# Patient Record
Sex: FEMALE | Race: WHITE
[De-identification: names, ages, dates, MRNs, and addresses within clinical notes are randomized per-mention and may not be internally consistent; named-entity substitution may affect disease eponyms.]

---

## 2018-03-29 ENCOUNTER — HOSPITAL ENCOUNTER (EMERGENCY)
Dept: HOSPITAL 57 - BURERS | Age: 53
Discharge: HOME | End: 2018-03-29
Payer: COMMERCIAL

## 2018-03-29 DIAGNOSIS — K57.92: Primary | ICD-10-CM

## 2018-03-29 DIAGNOSIS — E05.90: ICD-10-CM

## 2018-03-29 LAB
ALBUMIN SERPL BCG-MCNC: 4.3 G/DL (ref 3.5–5)
ALP SERPL-CCNC: 75 U/L (ref 40–150)
ALT SERPL W P-5'-P-CCNC: 30 U/L (ref 8–55)
ANION GAP SERPL CALC-SCNC: 20 MMOL/L (ref 10–20)
AST SERPL-CCNC: 27 U/L (ref 5–34)
BACTERIA UR QL AUTO: (no result) HPF
BASOPHILS # BLD AUTO: 0 THOU/UL (ref 0–0.2)
BASOPHILS NFR BLD AUTO: 0.4 % (ref 0–1)
BILIRUB SERPL-MCNC: 0.5 MG/DL (ref 0.2–1.2)
BUN SERPL-MCNC: 12 MG/DL (ref 9.8–20.1)
CALCIUM SERPL-MCNC: 9.2 MG/DL (ref 7.8–10.44)
CHLORIDE SERPL-SCNC: 102 MMOL/L (ref 98–107)
CO2 SERPL-SCNC: 23 MMOL/L (ref 22–29)
CREAT CL PREDICTED SERPL C-G-VRATE: 0 ML/MIN (ref 70–130)
EOSINOPHIL # BLD AUTO: 0.1 THOU/UL (ref 0–0.7)
EOSINOPHIL NFR BLD AUTO: 1 % (ref 0–10)
GLOBULIN SER CALC-MCNC: 2.8 G/DL (ref 2.4–3.5)
GLUCOSE SERPL-MCNC: 87 MG/DL (ref 70–105)
HGB BLD-MCNC: 14.8 G/DL (ref 12–16)
LYMPHOCYTES # BLD AUTO: 2.3 THOU/UL (ref 1.2–3.4)
LYMPHOCYTES NFR BLD AUTO: 22.3 % (ref 21–51)
MCH RBC QN AUTO: 29.1 PG (ref 27–31)
MCV RBC AUTO: 82.8 FL (ref 81–99)
MONOCYTES # BLD AUTO: 0.9 THOU/UL (ref 0.11–0.59)
MONOCYTES NFR BLD AUTO: 8.1 % (ref 0–10)
NEUTROPHILS # BLD AUTO: 7.2 THOU/UL (ref 1.4–6.5)
NEUTROPHILS NFR BLD AUTO: 68.3 % (ref 42–75)
PLATELET # BLD AUTO: 235 THOU/UL (ref 130–400)
POTASSIUM SERPL-SCNC: 4 MMOL/L (ref 3.5–5.1)
RBC # BLD AUTO: 5.1 MILL/UL (ref 4.2–5.4)
RBC UR QL AUTO: (no result) HPF (ref 0–3)
SODIUM SERPL-SCNC: 141 MMOL/L (ref 136–145)
SP GR UR STRIP: 1 (ref 1–1.03)
WBC # BLD AUTO: 10.5 THOU/UL (ref 4.8–10.8)
WBC UR QL AUTO: (no result) HPF (ref 0–3)

## 2018-03-29 PROCEDURE — 80053 COMPREHEN METABOLIC PANEL: CPT

## 2018-03-29 PROCEDURE — 81003 URINALYSIS AUTO W/O SCOPE: CPT

## 2018-03-29 PROCEDURE — 74177 CT ABD & PELVIS W/CONTRAST: CPT

## 2018-03-29 PROCEDURE — 85025 COMPLETE CBC W/AUTO DIFF WBC: CPT

## 2018-03-29 PROCEDURE — 36415 COLL VENOUS BLD VENIPUNCTURE: CPT

## 2018-03-29 PROCEDURE — 81015 MICROSCOPIC EXAM OF URINE: CPT

## 2018-03-29 NOTE — CT
CT ABDOMEN AND PELVIS WITH CONTRAST:

3/29/18

 

Spiral CT of the abdomen and pelvis was performed for evaluation of left lower quadrant pain. Axial s
lices were acquired using IV contrast only. Oral contrast was deferred by request. Axial slices were 
acquired, then coronal reconstructions were done. 

 

The primary finding on this study is an area of inflammatory streaking around the left colon at the j
unction of the lower descending colon and proximal sigmoid colon. The findings most likely represent 
diverticulitis, though there were not a plethora of diverticula noted in this patients colon. There i
s no dilation of bowel. No free air was seen. There is a trace of free fluid in the pelvis on the lef
t side. 

 

The lung bases are clear except for some minor dependent atelectasis. There is a question of a little
 pericardial fluid posteriorly, but I note that on the 2013 scan this area looked exactly the same so
 it does not concern me. The liver and spleen showed no acute findings. Mild prominence of the spleni
c size is identical to 2013. The pancreas appears normal. Previously there was concern about the size
 of the common bile duct but there is none today. No definite gallbladder calcifications are seen, th
ough on a few slices, there is some lucent areas in it. Lucent stones are a possibility but would be 
best demonstrated by ultrasound. The aorta, kidneys, and adrenal glands showed no acute findings. 

 

CT of the pelvis showed no pelvic masses, inflammatory changes other than those listed above around t
he distal left colon, and there was no adenopathy of concern. 

 

IMPRESSION:  

Inflammatory change at he junction of the lower descending and proximal sigmoid colon. The findings m
ost likely represent mild acute diverticulitis. If this does not improve over time, then further negra
roenterology followup could be needed. 

 

POS: HOME